# Patient Record
Sex: MALE | Race: WHITE | URBAN - METROPOLITAN AREA
[De-identification: names, ages, dates, MRNs, and addresses within clinical notes are randomized per-mention and may not be internally consistent; named-entity substitution may affect disease eponyms.]

---

## 2017-01-16 ENCOUNTER — HISTORICAL (OUTPATIENT)
Dept: RADIOLOGY | Facility: HOSPITAL | Age: 82
End: 2017-01-16

## 2017-06-13 ENCOUNTER — HISTORICAL (OUTPATIENT)
Dept: RADIOLOGY | Facility: HOSPITAL | Age: 82
End: 2017-06-13

## 2017-08-22 ENCOUNTER — HISTORICAL (OUTPATIENT)
Dept: SURGERY | Facility: HOSPITAL | Age: 82
End: 2017-08-22

## 2017-08-31 ENCOUNTER — HISTORICAL (OUTPATIENT)
Dept: ANESTHESIOLOGY | Facility: HOSPITAL | Age: 82
End: 2017-08-31

## 2017-09-12 ENCOUNTER — HISTORICAL (OUTPATIENT)
Dept: SURGERY | Facility: HOSPITAL | Age: 82
End: 2017-09-12

## 2017-09-14 ENCOUNTER — HISTORICAL (OUTPATIENT)
Dept: ANESTHESIOLOGY | Facility: HOSPITAL | Age: 82
End: 2017-09-14

## 2018-02-26 ENCOUNTER — HISTORICAL (OUTPATIENT)
Dept: RADIOLOGY | Facility: HOSPITAL | Age: 83
End: 2018-02-26

## 2018-03-01 ENCOUNTER — HISTORICAL (OUTPATIENT)
Dept: RADIOLOGY | Facility: HOSPITAL | Age: 83
End: 2018-03-01

## 2018-05-09 ENCOUNTER — HISTORICAL (OUTPATIENT)
Dept: RADIOLOGY | Facility: HOSPITAL | Age: 83
End: 2018-05-09

## 2018-05-14 ENCOUNTER — HISTORICAL (OUTPATIENT)
Dept: RADIOLOGY | Facility: HOSPITAL | Age: 83
End: 2018-05-14

## 2018-10-02 ENCOUNTER — HOSPITAL ENCOUNTER (OUTPATIENT)
Dept: MEDSURG UNIT | Facility: HOSPITAL | Age: 83
End: 2018-10-02
Attending: INTERNAL MEDICINE | Admitting: INTERNAL MEDICINE

## 2019-02-01 ENCOUNTER — HISTORICAL (OUTPATIENT)
Dept: RADIOLOGY | Facility: HOSPITAL | Age: 84
End: 2019-02-01

## 2019-03-13 ENCOUNTER — HISTORICAL (OUTPATIENT)
Dept: RADIOLOGY | Facility: HOSPITAL | Age: 84
End: 2019-03-13

## 2019-03-15 ENCOUNTER — HISTORICAL (OUTPATIENT)
Dept: RADIOLOGY | Facility: HOSPITAL | Age: 84
End: 2019-03-15

## 2019-04-04 ENCOUNTER — HISTORICAL (OUTPATIENT)
Dept: RADIOLOGY | Facility: HOSPITAL | Age: 84
End: 2019-04-04

## 2019-08-14 ENCOUNTER — HISTORICAL (OUTPATIENT)
Dept: RADIOLOGY | Facility: HOSPITAL | Age: 84
End: 2019-08-14

## 2020-12-07 ENCOUNTER — HISTORICAL (OUTPATIENT)
Dept: RADIOLOGY | Facility: HOSPITAL | Age: 85
End: 2020-12-07

## 2020-12-14 ENCOUNTER — HISTORICAL (OUTPATIENT)
Dept: RADIOLOGY | Facility: HOSPITAL | Age: 85
End: 2020-12-14

## 2021-01-15 ENCOUNTER — HISTORICAL (OUTPATIENT)
Dept: RADIOLOGY | Facility: HOSPITAL | Age: 86
End: 2021-01-15

## 2021-02-10 ENCOUNTER — HISTORICAL (OUTPATIENT)
Dept: LAB | Facility: HOSPITAL | Age: 86
End: 2021-02-10

## 2021-04-09 ENCOUNTER — HISTORICAL (OUTPATIENT)
Dept: ADMINISTRATIVE | Facility: HOSPITAL | Age: 86
End: 2021-04-09

## 2021-04-14 ENCOUNTER — HISTORICAL (OUTPATIENT)
Dept: RADIOLOGY | Facility: HOSPITAL | Age: 86
End: 2021-04-14

## 2021-04-15 ENCOUNTER — HOSPITAL ENCOUNTER (OUTPATIENT)
Dept: MEDSURG UNIT | Facility: HOSPITAL | Age: 86
End: 2021-04-17
Attending: SPECIALIST | Admitting: SPECIALIST

## 2021-04-17 LAB — GRAM STN SPEC: NORMAL

## 2021-04-19 LAB — FINAL CULTURE: NORMAL

## 2021-04-21 LAB — FINAL CULTURE: NO GROWTH

## 2021-04-28 ENCOUNTER — HISTORICAL (OUTPATIENT)
Dept: RADIOLOGY | Facility: HOSPITAL | Age: 86
End: 2021-04-28

## 2021-05-17 LAB — FINAL CULTURE: NORMAL

## 2022-04-30 NOTE — DISCHARGE SUMMARY
DISCHARGE DIAGNOSES:    1. Hypotension secondary to poor oral intake with dizziness and falls. Patient responding well to treatment with crystalloids.  2. History of restless legs syndrome.  3. History of mild anxiety.  4. History of gastrointestinal bleed in the past.  5. History of bilateral hip pain.    CHIEF COMPLAINT:  I am feeling weak and I fell at home and the blood pressure is low.    HISTORY OF PRESENT ILLNESS:  The patient is a very pleasant 88-year-old gentleman who lives alone at home.  Actually belongs to a nursing home but is stubborn not to go there. Came to the clinic complaining of dizziness and fall which was happening since this morning.  One of the nurses who works for home health care for [employer] took his blood pressure.  It was in the 70s.  In the clinic, patient's blood pressure was 78/60.  He was immediately admitted to the hospital.  IV crystalloid fluids were given.  He denied any nausea, diarrhea or any vomiting.  Besides that, he did not have any specific complaint.    PAST MEDICAL HISTORY:  Significant for restless legs syndrome, chronic renal insufficiency, orthostatic hypotension, benign prostatic hypertrophy, mild anxiety and depression with his wife's death, and in the recent past GI bleed secondary to symptomatic anemia.    PAST SURGICAL HISTORY:  Includes knee surgery, left cataract surgery.  He also had a colonoscopy and EGD in the recent past with Dr. Kristen Aldridge.  Laparoscopic repair of hernia, open reduction of fracture of ankle, arthroscopy of the shoulder with rotator cuff repair, bilateral knee replacement, cataract surgery, colonoscopy, EGD.    FAMILY HISTORY:  Mother  age of 97 from old age.  Father at 72 from MI.  He has 4 brothers and 5 sisters, 1  in his 70s.    MEDICATIONS:  Which the patient is on:  Off and on he takes Lasix 20 mg p.r.n. as needed, midodrine 5 mg b.i.d., nitroglycerin 0.4 mg sublingual p.r.n., potassium chloride 10 mEq daily,  Mirapex 0.5 mg at bedtime, Carafate 1 g 4 times a day.    ALLERGIES:  NO KNOWN DRUG ALLERGIES.      SOCIAL HISTORY:  He is a .  He does not drink.  He does smoke cigars.  Lives by himself with a sitter. He has 3 children and they are far away from him.  They live in different towns.    PHYSICAL EXAMINATION:  VITAL SIGNS:  Blood pressure as mentioned was in low 80s and upper 70s systolic.  Diastolic blood pressure was in 60s and 50s.  Heart rate was 66-68.  HEAD:     HEAD:  Normocephalic.  Pupils bilaterally reactive, equal in size.  Mild conjunctival pallor.  No scleral icterus.  Trachea is midline.   CHEST:  Good bilateral air entry.   CVS:  First and second heart sounds are heard normally without any added murmurs.     ABDOMEN:  Soft, nontender.     EXTREMITIES:  Devoid of edema, cyanosis or clubbing.    LABS AND INVESTIGATIONS:  None new.    IMPRESSION:  Orthostatic hypotension.    PLAN:  Give the patient normal saline.  Once the blood pressure is above 110 and the patient is asymptomatic patient can be discharged home.     It was pleasure taking care of the patient during his stay at [facility].        KAMILAH/VINCENZO   DD: 10/02/2018 1853   DT: 10/02/2018 2147  Job # [id number]

## 2022-04-30 NOTE — OP NOTE
PREOPERATIVE DIAGNOSIS:  Right rotator cuff tear.    POSTOPERATIVE DIAGNOSIS:  Massive rotator cuff tear.    PROCEDURE:  Arthroscopy, long head of the biceps release, followed by mini deltoid open double row repair.    ASSISTANT:  Yuliana.    COMPLICATIONS:  None.    INDICATIONS FOR PROCEDURE:  The patient is an 86-year-old male who tore his rotator cuff.  He has severe limitation of abduction.    DESCRIPTION OF PROCEDURE:  He was brought to the OR, given IV antibiotics and general anesthesia.  Shoulder was examined arthroscopically.  He had a massive cuff tear with retraction.  His biceps tendon was released.  Did a mini deltoid split and repaired his rotator cuff.  I used 2 primary Linvatec suture anchors, 4 horizontal mattress throws followed by a double row and then a bone tunnel.  I was able to get a marginal repair.  If this fails, he may be a candidate for reverse arthroplasty.  The wound was irrigated.  The fascia was closed with 0 Vicryl, subcu with 2-0, staples.  Sterile dressing applied.  No complications.        RUTH/VINCENZO   DD: 09/14/2017 0854   DT: 09/14/2017 0904  Job # [id number]

## 2022-04-30 NOTE — CONSULTS
DATE OF CONSULTATION:      CONSULTING PHYSICIAN:  Donovan Chacko M.D.    RENAL FOR CONSULTATION:  Medical management of a patient who has been admitted to Dr. Saunders for dislocation of the left shoulder prosthesis.    HISTORY OF PRESENT ILLNESS:  The patient is a very pleasant 89-year-old gentleman who recently signed AMA from the nursing home, where he was admitted to the SNF unit, and left there after __________ hours.  After a long struggle, we had placed him there for his safety and his health because he has been falling a lot.  Before that, he was admitted to the hospital for orthostatic hypotension, but patient had left, and __________ history of multiple falls, and he is having some pain in the shoulder.  X-ray was done.  He follows with Dr. Saunders, who is his orthopedic surgeon.  Dr. Saunders did the x-ray and found that the patient had dislocated the prosthesis of the left shoulder.  As a result, he was admitted, and I was consulted for the medical management.  On further asking the patient how the patient feels, he is pleasant.  He has pain in the left shoulder, but he says it is nothing to write home about, and he also says that besides that he does not have any __________specific complaint.    PAST MEDICAL HISTORY:  Significant for BPH __________ tamsulosin, restless legs syndrome, anxiety, gait abnormalities, multiple falls, osteoarthritis.  Atrial fibrillation with controlled ventricular response, not on Eliquis because of multiple falls.  DVT of the left cephalic and brachial veins in the past, early cognitive decline, bilateral edema of the legs.  Anemia in the past, requiring transfusion.    PAST SURGICAL HISTORY:  Includes endoscopy, colonoscopy and EGD, polypectomy, arthroscopy of the shoulder with a rotator cuff tear, arthroscopy, removal of hernia, bilateral replacement of knees, and open reduction of ankle fracture.    FAMILY HISTORY:  Pertinent for mother  at age of 97.  Father   of MI when he was young.  He has 4 brothers and 5 sisters.  One brother  in his 70s.  He is a , and his wife  a few years ago.    MEDICATIONS:  The patient takes only the medication as he needs Lasix as needed, sodium chloride tablets as needed, midodrine 5 mg as needed.  He used to be on tamsulosin for BPH, but he does not need anymore.  __________    ALLERGIES:  SCOPOLAMINE HYDROBROMIDE TRIHYDRATE.    SOCIAL HISTORY:  He does smoke cigars occasionally.  He drank in the past.  No use of any illicit drugs.    REVIEW OF SYSTEMS:  As mentioned __________.    PHYSICAL EXAMINATION:  GENERAL:  Patient alert and oriented x3.     HEENT:  Head is normocephalic.  Pupils equal in size.  Mild conjunctival pallor.  No scleral icterus.     NECK:  Trachea is midline.     CHEST:  Good bilateral air entry.   CVS :  1st and 2nd heart sounds are heard normally, without any murmurs.     ABDOMEN:  Soft, nontender.   EXTREMITIES:  Devoid of edema, cyanosis or clubbing.    LABS AND INVESTIGATIONS:  Sodium 140, potassium __________, chloride 116, bicarb 18, calcium 8.4, magnesium __________, glucose 84, BUN 18, creatinine 1.5 and GFR of 44.  Hemoglobin 10.7, hematocrit 30.9, platelet count 324, WBC 6.2, .    IMPRESSION:    1. Dislocation of the prosthesis of the left shoulder.  2. History of atrial fibrillation with controlled ventricular response, not on Eliquis secondary to history of falls.  3. History of BPH.  4. History of systemic inflammatory response syndrome.  5. History of early cognitive decline.  6. History of multiple falls.    PLAN:  To follow the patient along with Dr. Saunders.  Eliquis is on hold.  No antithrombotics for now.  Bilateral prophylaxis for DVT with SCDs.  Continue with the beta blockers.  Continue his __________ medication.  Will follow the patient postoperatively __________ or closed reduction will be decided __________ plan of Dr. Saunders.         Pleasure taking care of the patient  along with Dr. Saunders during his stay at [facility].  I thank Dr. Saunders.        KAMILAH/VINCENZO   DD: 04/15/2021 2142   DT: 04/15/2021 2250  Job # [id number]    cc: Az Saunders M.D.

## 2022-04-30 NOTE — OP NOTE
PREOPERATIVE DIAGNOSIS:  Dislocated left reverse total shoulder.    POSTOPERATIVE DIAGNOSIS:  Dislocated left reverse total shoulder.    PROCEDURE:  Left shoulder revision, converting his reverse to a CTA and removal of the glenosphere.    ANESTHESIA:  General.    BLOOD LOSS:  300 cc.    COMPLICATIONS:  None.    INDICATION:  The patient is a 89-year-old male who underwent left shoulder reverse arthroplasty in January.  Unfortunately, he has had multiple falls and he dislocated his left shoulder, I believe probably chronically, perhaps over a month ago.  Did not have pain.  Presented to clinic with prominence anteriorly.  Options were discussed.  I thought it was reasonable to attempt a revision.    DESCRIPTION OF THE PROCEDURE:  He was brought to the OR, given sedation.  I tried a reduction, which was unsuccessful.  We then prepped and draped him, exposed him through the old scar deltopectoral.  Subscap was taken down.  The shoulder was quite contracted and not reducible at all, even with extensile release.  I felt the chance of a redislocation would be near 100%, so I converted him to a CTA head with removal of the glenosphere, base plate, and basically perform a ream and run operation revision.  The glenosphere was removed.  The base plate was removed with the screws.  He had very little bone stock, but did have a dished-out area glenoid, so that was left alone.  Configured the proximal end.  After removal of poly, reamed the proximal humerus to accommodate a CTA Delta extend head.  This was then impacted.  It was a 48 x 21.  I reduced it.  Of course, it does not snap into position like a more constrained, but I think this will do the job.  The wound was Pulsavac lavaged.  I closed the subscap carefully with     interrupted #1 Vicryl, the deltoid and soft tissue envelope anterior with interrupted #1 meticulously.  Subcu with 2-0 and staples.  Sterile dressing applied.  No complications.        MRH/MODL    DD: 04/16/2021 1336   DT: 04/16/2021 1423  Job # [id number]

## 2022-05-02 NOTE — HISTORICAL [facility]
This is a historical note converted from [employer]. Formatting and pictures may have been removed.  Please reference [employer] for original formatting and attached multimedia. Admit and Discharge Dates  Admit Date: 04/15/2021  Discharge Date: 04/17/2021  Physicians  Attending Physician - Chip SLATER, Az CALERO  Admitting Physician - Chip SLATER, Az CALERO  Consulting Physician - Ginna SLATER, Donovan  Primary Care Physician - Ginna SLATER, Donovan  Discharge Diagnosis  1.?CKD - chronic kidney disease?N18.9  2.?DVT - Deep vein thrombosis?I82.409  3.?Impaired mobility?Z74.09  4.?Morbid obesity?E66.01  5.?Restless legs syndrome?G25.81  6.?Tobacco user?Z72.0  7.?Anemia?D64.9  8.?Atrial fibrillation?I48.91  9.?History of osteoarthritis?Z87.39  Unspecified dislocation of left shoulder joint, initial encounter?S43.005A  Surgical Procedures  04/16/2021 - [id number] - Total Reverse Shoulder  Immunizations  No immunizations recorded for this visit.  Significant Findings  Patient underwent?blood transfusion of 1 unit postoperatively.  Objective  Vitals & Measurements  T:?36.5? ?C (Axillary)? TMIN:?36.2? ?C (Axillary)? TMAX:?37.1? ?C (Axillary)? HR:?82(Monitored)? RR:?20? BP:?132/76? SpO2:?100%?  Physical Exam  General:?well-developed well-nourished in no acute distress  Eye: EOMI, clear conjunctiva, eyelids normal  HENT:?Normocephalic/atraumatic, oropharynx and nasal mucosal surfaces moist  Neck: full range of motion, no lymphadenopathy  Respiratory:?Non-labored breathing  Cardiovascular:?regular rate and rhythm, no edema  Gastrointestinal:?soft, non-tender, non-distended, without masses to palpation  Genitourinary: no CVA tenderness to palpation  Musculoskeletal:?? Left upper extremity:?Aquacel dressing is clean dry and intact to the anterior shoulder. ?He is neurovascular intact to the distal extremity. ?There is no excessive swelling or erythema noted about the incision area. ?Range of motion is deferred.  Integumentary: no rashes or  skin lesions present  Neurologic: cranial nerves intact, no signs of peripheral neurological deficit, motor/sensory function intact  ?  Patient Discharge Condition  Stable, denies chest pain, calf pain, dyspnea or shortness of breath. ?Hemoglobin is 10.7 hematocrit is 33.9  Discharge Disposition  Patient is discharged home. ?He is given prescriptions for Norco 10/325 mg every 6 hours as needed for pain and Duricef 500 mg twice daily for 10 days.? He has resumed his preoperative dose of Eliquis?5 mg twice daily. ?He will follow-up with Dr. Saunders?on 4/28/2021 at 1:30 PM. ?Aquacel dressing may stay in place until that time. ?He may remove the sling and swath?several times a day to perform gentle range of motion of the elbow wrist and hand as well as gentle Codman exercises?of the shoulder.   Discharge Medication Reconciliation  Prescribed  acetaminophen-HYDROcodone, Oral, q4hr, PRN pain, mild  Continue  apixaban (Eliquis 5 mg oral tablet)?5 mg, Oral, BID  metoprolol (Metoprolol Tartrate 25 mg oral tablet)?25 mg, Oral, q8hr  rOPINIRole (rOPINIRole 4 mg oral tablet)?4 mg, Oral, BID  Education and Orders Provided  Discharge - 04/17/21 13:00:00 CDT, Home, D/C to home. Sling and swath, may remove several times a day for gentle ROM of elbow, wrist and hand, and gentle Codmans. [place] 10/325ng q 6hr prn, Duricef 500mg BID x 10d, f/u with Dr. Saunders 4/28/21 @ 1:30?  Follow up  Az Saunders, on 04/28/2021  Car Seat Challenge  No Qualifying Data